# Patient Record
Sex: FEMALE | Race: ASIAN | NOT HISPANIC OR LATINO | Employment: STUDENT | URBAN - METROPOLITAN AREA
[De-identification: names, ages, dates, MRNs, and addresses within clinical notes are randomized per-mention and may not be internally consistent; named-entity substitution may affect disease eponyms.]

---
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
## 2023-01-09 PROBLEM — D50.9 IRON DEFICIENCY ANEMIA: Chronic | Status: ACTIVE | Noted: 2023-01-09

## 2023-01-09 PROBLEM — Z09 HOSPITAL DISCHARGE FOLLOW-UP: Status: ACTIVE | Noted: 2023-01-09

## 2023-01-12 PROBLEM — R50.9 FEVER: Status: RESOLVED | Noted: 2022-12-20 | Resolved: 2023-01-12

## 2023-01-13 PROBLEM — M32.14 SYSTEMIC LUPUS ERYTHEMATOSUS WITH GLOMERULAR DISEASE: Status: ACTIVE | Noted: 2023-01-13

## 2023-01-17 PROBLEM — L03.019 PARONYCHIA OF FINGER: Status: ACTIVE | Noted: 2023-01-17

## 2023-03-27 PROBLEM — N39.0 URINARY TRACT INFECTION WITHOUT HEMATURIA: Status: RESOLVED | Noted: 2022-12-20 | Resolved: 2023-03-27

## 2023-04-10 PROBLEM — Z09 HOSPITAL DISCHARGE FOLLOW-UP: Status: RESOLVED | Noted: 2023-01-09 | Resolved: 2023-04-10

## 2023-12-16 PROBLEM — H52.13 MYOPIA OF BOTH EYES: Status: ACTIVE | Noted: 2023-12-16

## 2023-12-16 PROBLEM — H53.122 TRANSIENT VISUAL LOSS OF LEFT EYE: Status: ACTIVE | Noted: 2023-12-16

## 2023-12-20 PROBLEM — F33.1 MDD (MAJOR DEPRESSIVE DISORDER), RECURRENT EPISODE, MODERATE: Status: ACTIVE | Noted: 2023-12-20

## 2023-12-20 PROBLEM — F41.1 GENERALIZED ANXIETY DISORDER WITH PANIC ATTACKS: Status: ACTIVE | Noted: 2023-12-20

## 2023-12-20 PROBLEM — F41.0 GENERALIZED ANXIETY DISORDER WITH PANIC ATTACKS: Status: ACTIVE | Noted: 2023-12-20

## 2023-12-20 PROBLEM — F33.2 SEVERE EPISODE OF RECURRENT MAJOR DEPRESSIVE DISORDER, WITHOUT PSYCHOTIC FEATURES: Status: ACTIVE | Noted: 2023-12-20

## 2023-12-21 ENCOUNTER — SOCIAL WORK (OUTPATIENT)
Dept: ADMINISTRATIVE | Facility: OTHER | Age: 18
End: 2023-12-21

## 2023-12-21 NOTE — PROGRESS NOTES
Sw received a consult to assist with counseling. Sw called Patient ([phone]). A voice message was left requesting she call back.    Tiesha Abdi LCSW    [phone]

## 2023-12-27 ENCOUNTER — SOCIAL WORK (OUTPATIENT)
Dept: ADMINISTRATIVE | Facility: OTHER | Age: 18
End: 2023-12-27

## 2023-12-27 NOTE — PROGRESS NOTES
Sw received a consult to assist with counseling. Sw called Patient ([phone]). A voice message was left requesting she call back.    Tiesha Abdi LCSW    [phone]

## 2023-12-28 ENCOUNTER — SOCIAL WORK (OUTPATIENT)
Dept: ADMINISTRATIVE | Facility: OTHER | Age: 18
End: 2023-12-28

## 2023-12-28 NOTE — PROGRESS NOTES
Sw received a consult to assist with counseling. Sw called and spoke to Patient ([phone]). She is agreeable to counseling. Etelvina faxed a referral to [facility] to review.    Tiesha Abdi LCSW    [phone]

## 2023-12-29 ENCOUNTER — SOCIAL WORK (OUTPATIENT)
Dept: ADMINISTRATIVE | Facility: OTHER | Age: 18
End: 2023-12-29

## 2023-12-29 PROBLEM — D68.61 ANTIPHOSPHOLIPID SYNDROME: Status: ACTIVE | Noted: 2023-12-29

## 2023-12-29 NOTE — PROGRESS NOTES
Etelvina called [facility] to follow-up on the counseling referral. Audrey reports she received the referral. She called Patient yesterday and today with no answer. Audrey will try again later. Etelvina verbalized appreciation.    [facility]  [address]  [address]  [place], [place]  [phone]    Tiesha Abdi LCSW    [phone]

## 2024-02-12 PROBLEM — D63.8 ANEMIA OF CHRONIC DISEASE: Status: ACTIVE | Noted: 2024-02-12

## 2024-02-12 PROBLEM — N04.9 NEPHROTIC SYNDROME: Status: ACTIVE | Noted: 2024-02-12

## 2024-02-13 PROBLEM — M32.9 EXACERBATION OF SYSTEMIC LUPUS ERYTHEMATOSUS: Status: RESOLVED | Noted: 2022-12-19 | Resolved: 2024-02-13

## 2024-02-27 PROBLEM — N17.9 AKI (ACUTE KIDNEY INJURY): Status: ACTIVE | Noted: 2024-02-27

## 2024-03-11 PROBLEM — I73.00 RAYNAUD'S PHENOMENON WITHOUT GANGRENE: Status: ACTIVE | Noted: 2024-03-11

## 2024-06-03 PROBLEM — N17.9 AKI (ACUTE KIDNEY INJURY): Status: RESOLVED | Noted: 2024-02-27 | Resolved: 2024-06-03

## 2024-06-27 ENCOUNTER — ON-DEMAND VIRTUAL (OUTPATIENT)
Dept: URGENT CARE | Facility: CLINIC | Age: 19
End: 2024-06-27

## 2024-06-27 DIAGNOSIS — M32.14 LUPUS NEPHRITIS, ISN/RPS CLASS IV: ICD-10-CM

## 2024-06-27 DIAGNOSIS — R30.0 DYSURIA: Primary | ICD-10-CM

## 2024-06-27 DIAGNOSIS — N89.8 VAGINAL DISCHARGE: ICD-10-CM

## 2024-06-27 PROBLEM — N30.90 CYSTITIS: Status: ACTIVE | Noted: 2024-06-27

## 2024-06-27 NOTE — PROGRESS NOTES
Subjective:      Patient ID: Nuria Grijalva is a 19 y.o. female.    Vitals:  vitals were not taken for this visit.     Chief Complaint: Dysuria (And vaginal discharge)      Visit Type: TELE AUDIOVISUAL    Present with the patient at the time of consultation: TELEMED PRESENT WITH PATIENT: None at home in [place]    Past Medical History:   Diagnosis Date    Anticoagulant long-term use     Blood in stool 12/24/2022    Patient complained of 2 episodes of small amount of rectal bleeding (bright red blood) on bowel movement, one yesterday night and one this morning. Denies pain on defecation, constipation or diarrhea.    Headache     HTN (hypertension) 12/20/2022    Lupus 06/2021    Renal disorder     Systemic lupus erythematosus, organ or system involvement unspecified      Past Surgical History:   Procedure Laterality Date    RENAL BIOPSY Left 12/22/2022    Procedure: BIOPSY, KIDNEY;  Surgeon: Joao Cleary MD;  Location: [facility] INTERVENTIONAL NEPHROLOGY;  Service: Interventional Nephrology;  Laterality: Left;    RENAL BIOPSY Left 2/27/2024    Procedure: BIOPSY, KIDNEY;  Surgeon: Joseluis Kovacs MD;  Location: [facility] INTERVENTIONAL NEPHROLOGY;  Service: Interventional Nephrology;  Laterality: Left;    WISDOM TOOTH EXTRACTION  2019 1/2     Review of patient's allergies indicates:   Allergen Reactions    Garlic Photosensitivity    Oxytetracycline     Erythromycin Rash     Current Outpatient Medications on File Prior to Visit   Medication Sig Dispense Refill    apixaban (ELIQUIS) 5 mg Tab Take 1 tablet (5 mg total) by mouth 2 (two) times daily. 60 tablet 1    aspirin 81 MG Chew Take 81 mg by mouth once daily.      EScitalopram oxalate (LEXAPRO) 10 MG tablet Take 1 tablet (10 mg total) by mouth once daily. 30 tablet 1    ferrous sulfate 324 mg (65 mg iron) TbEC Take 1 tablet (324 mg total) by mouth once daily. 30 tablet 3    hydroxychloroquine (PLAQUENIL) 200 mg tablet Take 1 tablet (200 mg total) by mouth 2 (two) times  daily. 60 tablet 5    hydrOXYzine HCL (ATARAX) 25 MG tablet Take 1 tablet (25 mg total) by mouth 3 (three) times daily. 90 tablet 1    losartan (COZAAR) 50 MG tablet Take 1 tablet (50 mg total) by mouth once daily. 90 tablet 3    nitroGLYCERIN 2% TD oint (NITROGLYN) 2 % ointment Apply a small amount twice daily to fingers as needed for raynaud's phenomenon. 45 inch 5    ondansetron (ZOFRAN) 8 MG tablet TAKE 1 TABLET BY MOUTH TWICE A DAY AS NEEDED FOR NAUSEA      pantoprazole (PROTONIX) 40 MG tablet Take 1 tablet (40 mg total) by mouth once daily. for 14 days 14 tablet 0    predniSONE (DELTASONE) 20 MG tablet Take 1 tablet (20 mg total) by mouth once daily. 90 tablet 1    spironolactone (ALDACTONE) 50 MG tablet Take 1 tablet (50 mg total) by mouth 2 (two) times daily. 60 tablet 3     No current facility-administered medications on file prior to visit.     Family History   Problem Relation Name Age of Onset    Heart disease Maternal Grandmother      Cancer Maternal Grandmother      Heart disease Maternal Grandfather      Cancer Maternal Grandfather      Arthritis Mother      Diabetes Father      Hyperlipidemia Father             [facility] Intake    6/27/2024  3:47 PM CDT - Filed by Patient   What is your current physical address in the event of a medical emergency? [address]   Are you able to take your vital signs? No   Please attach any relevant images or files          HPI  18yo female with h/o lupus nephritis presents with c/o dysuria x five days. When wipes does get little pink/orange. Also with yellow vaginal discharge and external vaginal itching. Denies fevers, pelvic or flank pain.         Constitution: Negative for fever.   Genitourinary:  Positive for dysuria and vaginal discharge. Negative for flank pain and pelvic pain.        Objective:   The physical exam was conducted virtually.  Physical Exam   Constitutional: She is oriented to person, place, and time.  Non-toxic appearance. She does not  appear ill. No distress.   HENT:   Head: Normocephalic and atraumatic.   Neck: Neck supple.   Pulmonary/Chest: Effort normal. No respiratory distress.   Abdominal: Normal appearance. There is no abdominal tenderness.   Neurological: She is alert and oriented to person, place, and time. Coordination normal.   Skin: Skin is dry, not diaphoretic, not pale and no rash.   Psychiatric: Her behavior is normal. Judgment and thought content normal.       Assessment:     1. Dysuria    2. Vaginal discharge    3. Lupus nephritis, ISN/RPS class IV        Plan:       Dysuria    Vaginal discharge    Lupus nephritis, ISN/RPS class IV    Recommend in person visit for evaluation with urine and vaginal testing. Pt VU and will go to urgent care now.

## 2024-08-15 ENCOUNTER — NURSE TRIAGE (OUTPATIENT)
Dept: ADMINISTRATIVE | Facility: CLINIC | Age: 19
End: 2024-08-15
Payer: MEDICAID

## 2024-08-16 NOTE — TELEPHONE ENCOUNTER
"Pt states noticed "a cyst or boil or ingrown hair" on left buttocks , size of quarter. Pt states sore to touch. Pt denies fever or spreading redness. Per protocol, see pcp within 24 hours. Discussed at home care and symptoms to monitor for. Offered appt with PCP, pt states will go to [facility] care near [facility]. Advised pt to call back for any further questions or concerns.   Reason for Disposition   Boil > 2 inches across (> 5 cm; larger than a golf ball or ping pong ball)    Additional Information   Negative: [1] Widespread rash AND [2] bright red, sunburn-like AND [3] too weak to stand   Negative: Sounds like a life-threatening emergency to the triager   Negative: Widespread red rash   Negative: Black (necrotic) color or blisters develop in area of boil   Negative: Patient sounds very sick or weak to the triager   Negative: Red streak from area of infection   Negative: SEVERE pain (e.g., excruciating)   Negative: Fever > 100.4 F (38.0 C)    Protocols used: Boil (Skin Abscess)-A-AH    "

## 2024-08-28 PROBLEM — R07.9 CHEST PAIN AT REST: Status: RESOLVED | Noted: 2024-08-28 | Resolved: 2024-08-28

## 2024-08-28 PROBLEM — R07.9 CHEST PAIN AT REST: Status: ACTIVE | Noted: 2024-08-28

## 2024-08-28 PROBLEM — T80.818A: Status: ACTIVE | Noted: 2024-08-28

## 2024-08-28 PROBLEM — T80.818A EXTRAVASATION ACCIDENT: Status: ACTIVE | Noted: 2024-08-28

## 2024-08-28 PROBLEM — T80.818A: Status: RESOLVED | Noted: 2024-08-28 | Resolved: 2024-08-28

## 2024-10-23 ENCOUNTER — PATIENT MESSAGE (OUTPATIENT)
Dept: GASTROENTEROLOGY | Facility: CLINIC | Age: 19
End: 2024-10-23
Payer: MEDICAID

## 2024-11-07 PROBLEM — R00.0 TACHYCARDIA: Status: ACTIVE | Noted: 2024-11-07

## 2024-11-07 PROBLEM — R21 RASH AND OTHER NONSPECIFIC SKIN ERUPTION: Status: RESOLVED | Noted: 2022-12-19 | Resolved: 2024-11-07

## 2024-11-07 PROBLEM — H53.122 TRANSIENT VISUAL LOSS OF LEFT EYE: Status: RESOLVED | Noted: 2023-12-16 | Resolved: 2024-11-07
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
## 2024-11-07 PROBLEM — K21.9 GASTROESOPHAGEAL REFLUX DISEASE WITHOUT ESOPHAGITIS: Status: ACTIVE | Noted: 2024-11-07

## 2024-11-07 PROBLEM — T80.818A EXTRAVASATION ACCIDENT: Status: RESOLVED | Noted: 2024-08-28 | Resolved: 2024-11-07

## 2024-11-07 PROBLEM — L03.019 PARONYCHIA OF FINGER: Status: RESOLVED | Noted: 2023-01-17 | Resolved: 2024-11-07

## 2024-11-07 PROBLEM — F33.2 SEVERE EPISODE OF RECURRENT MAJOR DEPRESSIVE DISORDER, WITHOUT PSYCHOTIC FEATURES: Status: RESOLVED | Noted: 2023-12-20 | Resolved: 2024-11-07

## 2024-11-07 PROBLEM — R07.9 CHEST PAIN AT REST: Status: RESOLVED | Noted: 2024-08-28 | Resolved: 2024-11-07

## 2024-11-07 PROBLEM — Z30.9 ENCOUNTER FOR CONTRACEPTIVE MANAGEMENT: Status: RESOLVED | Noted: 2021-12-02 | Resolved: 2024-11-07

## 2024-11-07 PROBLEM — N30.90 CYSTITIS: Status: RESOLVED | Noted: 2024-06-27 | Resolved: 2024-11-07
